# Patient Record
Sex: MALE | Race: BLACK OR AFRICAN AMERICAN | Employment: FULL TIME | URBAN - METROPOLITAN AREA
[De-identification: names, ages, dates, MRNs, and addresses within clinical notes are randomized per-mention and may not be internally consistent; named-entity substitution may affect disease eponyms.]

---

## 2017-07-14 ENCOUNTER — OFFICE VISIT (OUTPATIENT)
Dept: UROLOGY | Age: 32
End: 2017-07-14

## 2017-07-14 VITALS
WEIGHT: 315 LBS | BODY MASS INDEX: 47.74 KG/M2 | DIASTOLIC BLOOD PRESSURE: 62 MMHG | HEART RATE: 72 BPM | OXYGEN SATURATION: 98 % | HEIGHT: 68 IN | SYSTOLIC BLOOD PRESSURE: 102 MMHG

## 2017-07-14 DIAGNOSIS — N48.0 BXO (BALANITIS XEROTICA OBLITERANS): Primary | ICD-10-CM

## 2017-07-14 LAB
BILIRUB UR QL STRIP: NEGATIVE
GLUCOSE UR-MCNC: NORMAL MG/DL
KETONES P FAST UR STRIP-MCNC: NEGATIVE MG/DL
PH UR STRIP: 6.5 [PH] (ref 4.6–8)
PROT UR QL STRIP: NORMAL MG/DL
SP GR UR STRIP: 1.01 (ref 1–1.03)
UA UROBILINOGEN AMB POC: NORMAL (ref 0.2–1)
URINALYSIS CLARITY POC: CLEAR
URINALYSIS COLOR POC: YELLOW
URINE BLOOD POC: NORMAL
URINE LEUKOCYTES POC: NEGATIVE
URINE NITRITES POC: NEGATIVE

## 2017-07-14 NOTE — PATIENT INSTRUCTIONS
Adult Circumcision: Before Your Surgery  What is circumcision? Circumcision removes the skin that covers the head of the penis. This is called the foreskin. Your doctor will \"push\" the foreskin from the head of the penis. Then he or she will trim the foreskin and sew down the edges. Your doctor may use any one of a number of ways to do this. You will have some small stitches. They will dissolve on their own. Most men go home the same day as the surgery. You may have the surgery because you can't roll back your foreskin. This is called phimosis. Or you may need the surgery because your foreskin is stuck behind the penis head. This is called paraphimosis. After surgery, these problems will go away. Some men have this surgery for Yazdanism or social reasons. You can go back to work and your normal routine within 1 week, depending on your job. Follow-up care is a key part of your treatment and safety. Be sure to make and go to all appointments, and call your doctor if you are having problems. It's also a good idea to know your test results and keep a list of the medicines you take. What happens before surgery? Surgery can be stressful. This information will help you understand what you can expect. And it will help you safely prepare for surgery. Preparing for surgery  · Understand exactly what surgery is planned, along with the risks, benefits, and other options. · Tell your doctors ALL the medicines, vitamins, supplements, and herbal remedies you take. Some of these can increase the risk of bleeding or interact with anesthesia. · If you take blood thinners, such as warfarin (Coumadin), clopidogrel (Plavix), or aspirin, be sure to talk to your doctor. He or she will tell you if you should stop taking these medicines before your surgery. Make sure that you understand exactly what your doctor wants you to do. · Your doctor will tell you which medicines to take or stop before your surgery.  You may need to stop taking certain medicines a week or more before surgery. So talk to your doctor as soon as you can. · If you have an advance directive let your doctor know. It may include a living will and a durable power of  for health care. Bring a copy to the hospital. If you don't have one, you may want to prepare one. It lets your doctor and loved ones know your health care wishes. Doctors advise that everyone prepare these papers before any type of surgery or procedure. What happens on the day of surgery? · Follow the instructions exactly about when to stop eating and drinking. If you don't, your surgery may be canceled. If your doctor told you to take your medicines on the day of surgery, take them with only a sip of water. · Take a bath or shower before you come in for your surgery. Do not apply lotions, colognes, or deodorants. · Take off all jewelry and piercings. And take out contact lenses, if you wear them. At the hospital or surgery center  · Bring a picture ID. · The area for surgery is often marked to make sure there are no errors. · You will be kept comfortable and safe by your anesthesia provider. The anesthesia may make you sleep. Or it may just numb the area being worked on. · The surgery usually takes less than 1 hour. Going home  · Be sure you have someone to drive you home. Anesthesia and pain medicine make it unsafe for you to drive. · You will be given more specific instructions about recovering from your surgery. They will cover things like diet, wound care, follow-up care, driving, and getting back to your normal routine. When should you call your doctor? · You have questions or concerns. · You don't understand how to prepare for your surgery. · You become ill before the surgery (such as fever, flu, or a cold). · You need to reschedule or have changed your mind about having the surgery. Where can you learn more? Go to http://eriberto-reema.info/.   Enter A552 in the search box to learn more about \"Adult Circumcision: Before Your Surgery. \"  Current as of: March 14, 2017  Content Version: 11.3  © 0995-3362 [employer]. Care instructions adapted under license by [employer] (which disclaims liability or warranty for this information). If you have questions about a medical condition or this instruction, always ask your healthcare professional. [place] [address] any warranty or liability for your use of this information.

## 2017-07-14 NOTE — PROGRESS NOTES
MrWillie Gely Chacko has a reminder for a \"due or due soon\" health maintenance. I have asked that he contact his primary care provider for follow-up on this health maintenance.

## 2017-07-14 NOTE — MR AVS SNAPSHOT
Visit Information Date & Time Provider Department Dept. Phone Encounter #  
 7/14/2017  1:00 PM Aba Brooke Sachin [phone] [id number] Follow-up Instructions Return if symptoms worsen or fail to improve. Follow-up and Disposition History Upcoming Health Maintenance Date Due HEMOGLOBIN A1C Q6M 1985 LIPID PANEL Q1 1986 FOOT EXAM Q1 8/26/1996 MICROALBUMIN Q1 8/26/1996 EYE EXAM RETINAL OR DILATED Q1 8/26/1996 Pneumococcal 19-64 Medium Risk (1 of 1 - PPSV23) 8/26/2005 DTaP/Tdap/Td series (1 - Tdap) 8/26/2007 INFLUENZA AGE 9 TO ADULT 8/1/2017 Allergies as of 7/14/2017  Review Complete On: 7/14/2017 By: Vika Kevin MD  
  
 Severity Noted Reaction Type Reactions Penicillins  05/04/2016    Unknown (comments) Not sure of reaction, happened when younger. Current Immunizations  Never Reviewed No immunizations on file. Not reviewed this visit You Were Diagnosed With   
  
 Codes Comments BXO (balanitis xerotica obliterans)    -  Primary ICD-10-CM: N48.0 ICD-9-CM: 607.81 Vitals BP Pulse Height(growth percentile) Weight(growth percentile) SpO2 BMI  
 102/62 (BP 1 Location: Right arm, BP Patient Position: Sitting) 72 5' 8\" (1.727 m) (!) 356 lb (161.5 kg) 98% 54.13 kg/m2 Smoking Status Never Smoker Vitals History BMI and BSA Data Body Mass Index Body Surface Area  
 54.13 kg/m 2 2.78 m 2 Preferred Pharmacy Pharmacy Name Phone [id number] [address] [phone] Your Updated Medication List  
  
   
This list is accurate as of: 7/14/17  4:45 PM.  Always use your most recent med list.  
  
  
  
  
 clobetasol 0.05 % topical cream  
Commonly known as:  O[place]en [place] Apply  to affected area two (2) times a day. metFORMIN 500 mg tablet Commonly known as:  GLUCOPHAGE Take 500 mg by mouth. nystatin topical cream  
Commonly known as:  MYCOSTATIN Apply  to affected area once over twenty-four (24) hours. We Performed the Following AMB POC URINALYSIS DIP STICK AUTO W/O MICRO [01193 CPT(R)] Follow-up Instructions Return if symptoms worsen or fail to improve. Patient Instructions Adult Circumcision: Before Your Surgery What is circumcision? Circumcision removes the skin that covers the head of the penis. This is called the foreskin. Your doctor will \"push\" the foreskin from the head of the penis. Then he or she will trim the foreskin and sew down the edges. Your doctor may use any one of a number of ways to do this. You will have some small stitches. They will dissolve on their own. Most men go home the same day as the surgery. You may have the surgery because you can't roll back your foreskin. This is called phimosis. Or you may need the surgery because your foreskin is stuck behind the penis head. This is called paraphimosis. After surgery, these problems will go away. Some men have this surgery for Orthodoxy or social reasons. You can go back to work and your normal routine within 1 week, depending on your job. Follow-up care is a key part of your treatment and safety. Be sure to make and go to all appointments, and call your doctor if you are having problems. It's also a good idea to know your test results and keep a list of the medicines you take. What happens before surgery? Surgery can be stressful. This information will help you understand what you can expect. And it will help you safely prepare for surgery. Preparing for surgery · Understand exactly what surgery is planned, along with the risks, benefits, and other options. · Tell your doctors ALL the medicines, vitamins, supplements, and herbal remedies you take.  Some of these can increase the risk of bleeding or interact with anesthesia. · If you take blood thinners, such as warfarin (Coumadin), clopidogrel (Plavix), or aspirin, be sure to talk to your doctor. He or she will tell you if you should stop taking these medicines before your surgery. Make sure that you understand exactly what your doctor wants you to do. · Your doctor will tell you which medicines to take or stop before your surgery. You may need to stop taking certain medicines a week or more before surgery. So talk to your doctor as soon as you can. · If you have an advance directive let your doctor know. It may include a living will and a durable power of  for health care. Bring a copy to the hospital. If you don't have one, you may want to prepare one. It lets your doctor and loved ones know your health care wishes. Doctors advise that everyone prepare these papers before any type of surgery or procedure. What happens on the day of surgery? · Follow the instructions exactly about when to stop eating and drinking. If you don't, your surgery may be canceled. If your doctor told you to take your medicines on the day of surgery, take them with only a sip of water. · Take a bath or shower before you come in for your surgery. Do not apply lotions, colognes, or deodorants. · Take off all jewelry and piercings. And take out contact lenses, if you wear them. At the hospital or surgery center · Bring a picture ID. · The area for surgery is often marked to make sure there are no errors. · You will be kept comfortable and safe by your anesthesia provider. The anesthesia may make you sleep. Or it may just numb the area being worked on. · The surgery usually takes less than 1 hour. Going home · Be sure you have someone to drive you home. Anesthesia and pain medicine make it unsafe for you to drive. · You will be given more specific instructions about recovering from your surgery.  They will cover things like diet, wound care, follow-up care, driving, and getting back to your normal routine. When should you call your doctor? · You have questions or concerns. · You don't understand how to prepare for your surgery. · You become ill before the surgery (such as fever, flu, or a cold). · You need to reschedule or have changed your mind about having the surgery. Where can you learn more? Go to http://eriberto-reema.info/. Enter T660 in the search box to learn more about \"Adult Circumcision: Before Your Surgery. \" Current as of: March 14, 2017 Content Version: 11.3 © 8731-8452 [employer]. Care instructions adapted under license by [employer] (which disclaims liability or warranty for this information). If you have questions about a medical condition or this instruction, always ask your healthcare professional. [address] any warranty or liability for your use of this information. Patient Instructions History Introducing [facility]! [facility] introduces [employer] patient portal. Now you can access parts of your medical record, email your doctor's office, and request medication refills online. 1. In your internet browser, go to [email or web address] 2. Click on the First Time User? Click Here link in the Sign In box. You will see the New Member Sign Up page. 3. Enter your [employer] Access Code exactly as it appears below. You will not need to use this code after youve completed the sign-up process. If you do not sign up before the expiration date, you must request a new code. · [employer] Access Code: [facility] Expires: 10/12/2017  1:31 PM 
 
4. Enter the last four digits of your Social Security Number (xxxx) and Date of Birth (mm/dd/yyyy) as indicated and click Submit. You will be taken to the next sign-up page. 5. Create a [employer] ID.  This will be your [employer] login ID and cannot be changed, so think of one that is secure and easy to remember. 6. Create a Dibbz password. You can change your password at any time. 7. Enter your Password Reset Question and Answer. This can be used at a later time if you forget your password. 8. Enter your e-mail address. You will receive e-mail notification when new information is available in [address] 9. Click Sign Up. You can now view and download portions of your medical record. 10. Click the Download Summary menu link to download a portable copy of your medical information. If you have questions, please visit the Frequently Asked Questions section of the [employer] website. Remember, [employer] is NOT to be used for urgent needs. For medical emergencies, dial 911. Now available from your iPhone and Android! Please provide this summary of care documentation to your next provider. Your primary care clinician is listed as Andrew Morgan. If you have any questions after today's visit, please call [phone].

## 2017-07-14 NOTE — PROGRESS NOTES
Saarvanan Perea    Chief Complaint   Patient presents with    New Patient     balanitis xerotica obliterans    Lichens Sclerosis (BXO)       History and Physical    The patient is a pleasant 73-year-old -American male who is here for second opinion regarding his genital issues. The patient has long-standing significant obesity and was diagnosed with diabetes as a result of an episode of monilia balanoposthitis. The patient had been circumcised prior to that but his obesity has given him significant skin redundancy. In May 2016 the patient underwent a revision to minimize the risk of persistent monilia balanoposthitis. The patient continued to have difficulty in the sense that he had very little penile shaft to work with either for voiding or for sex. He also would have intermittent fungal infections manifest by odor or discharge or flaky whitish debris. The patient also was noted that there are individual nodules that might come up and go down. The patient was seen at the [facility] for reconstructive surgery and his assessment was for weight loss, diabetes control, and topical application of appropriate medications. Past Medical History:   Diagnosis Date    Acquired buried penis     BXO (balanitis xerotica obliterans)     Diabetes ([address]     Fungal infection     Lichen sclerosus     Microscopic hematuria     Morbid obesity (HCC)     Painful erection     Skin fissure      Patient Active Problem List   Diagnosis Code    Painful erection N48.30    Morbid obesity ([address] E66.01    Microscopic hematuria R31.29    Fungal infection B49    Diabetes ([address] E11.9     Past Surgical History:   Procedure Laterality Date    HX ORTHOPAEDIC      right leg surgery     Current Outpatient Prescriptions   Medication Sig Dispense Refill    metFORMIN (GLUCOPHAGE) 500 mg tablet Take 500 mg by mouth.  clobetasol (TEMOVATE) 0.05 % topical cream Apply  to affected area two (2) times a day. 15 g 5    nystatin (MYCOSTATIN) topical cream Apply  to affected area once over twenty-four (24) hours. 2 Tube 5     Allergies   Allergen Reactions    Penicillins Unknown (comments)     Not sure of reaction, happened when younger. Social History     Social History    Marital status: SINGLE     Spouse name: N/A    Number of children: N/A    Years of education: N/A     Occupational History    Not on file. Social History Main Topics    Smoking status: Never Smoker    Smokeless tobacco: Never Used    Alcohol use No    Drug use: No    Sexual activity: Yes     Partners: Female     Other Topics Concern    Not on file     Social History Narrative      History reviewed. No pertinent family history. Visit Vitals    /62 (BP 1 Location: Right arm, BP Patient Position: Sitting)    Pulse 72    Ht 5' 8\" (1.727 m)    Wt (!) 356 lb (161.5 kg)    SpO2 98%    BMI 54.13 kg/m2     Physical        Gen: WDWN adult NAD obese with a BMI of 54 kg/m²  Head  : normocephalic,  Normal ROM; eyes without normal pupils, EOMs, no masses;  conjunctiva normal  Neck: normal movement,  no evident mass,  No evident adenopathy, trachea midline,  Lungs clear to auscultation with no rales or ronchi or rubs  Cardiac NSR with no murmur, rub, extra sounds  Abdobese  Flanks     -the scrotum is normally developed as has normal scrotal contents. There is some chronic inflammatory change across the upper midline. The penis actually has good exposure had a predictable amount of chronic inflammatory change surrounding it. There is a significant prepubic fat pad that essentially varies the penis.   I estimate that the flaccid stretched length from pubis to glans penis is actually within normal limits for the patient    Extremities- no edema, arthritis, deformity, swelling  Psych- oriented, no evident anxiety, no cognitive impairment evident    The urine is 1+ positive for blood on dipstick but I am only seeing a rare red blood cell on microscopic                  Impression/ PLAN  Chronic balanoposthitis    Plan:  I had an extended discussion with the patient and we have talked primarily about weight loss. The patient's issues are all pretty much due to his obesity and the subsequent diabetes. I believe that weight loss to his ideal body weight would result in complete resolution of his diabetes and his issues regarding his genital area would be completely resolved. The patient will use the topical agents that were previously mentioned to him and I have talked about a variety of exercise and dietary manipulations that he can try. This visit exceeded 45 minutes and >50% was counselling  The patient understands the discussion and plan    PLEASE NOTE:      This document has been produced using voice recognition software.   Unrecognized errors in transcription may be present    Erin Butler MD

## 2017-08-02 PROBLEM — N48.0 BXO (BALANITIS XEROTICA OBLITERANS): Status: ACTIVE | Noted: 2017-08-02

## 2017-08-24 ENCOUNTER — HOSPITAL ENCOUNTER (OUTPATIENT)
Dept: BARIATRICS/WEIGHT MGMT | Age: 32
Discharge: HOME | End: 2017-08-24

## 2017-08-24 ENCOUNTER — DOCUMENTATION ONLY (OUTPATIENT)
Dept: BARIATRICS/WEIGHT MGMT | Age: 32
End: 2017-08-24

## 2017-08-24 NOTE — PROGRESS NOTES
[address] Loss Tavares Haramn [address] [facility], [address]    Patient's Name: Ramesh Joya   Age: 28 y.o. YOB: 1985   Sex: male    Date:   8/24/2017    Insurance:            Session: 1 of 12  Revision:   Surgeon:  Dr. Xavier Rivas    Height: 5 f 9 Weight:    353      Lbs. BMI: 52.3   Pounds Lost since last month: 0               Pounds Gained since last month: 0    Starting Weight: 353   Previous Months Weight: 353  Overall Pounds Lost: 0 Overall Pounds Gained: 0      Do you smoke? None    Alcohol intake:  Number of drinks at a time:  None  Number of times a week: None    Class Guidelines    Guidelines are reviewed with patient at the start of every class. 1. Patient understands that weight loss trial classes must be consecutive. Patient understands if they miss a class, it is their responsibility to contact me to reschedule class. I will reach out to patient after their first no show. 2.  Patient understands the expectations that weight maintenance/weight loss is expected during the classes. Failure to demonstrate changes may result in one extra month of weight loss trial, followed by going back to see the surgeon. 3. Patient is also instructed to be doing their labs, blood work, psych visit, support group and any other test that the surgeon has used while they are working on their weight loss trial.    Other Pertinent Information:     Changes Made Since Last Class:     Eating Habits and Behaviors      Today we reviewed white diet principles. The main objective was focusing on carbohydrates. Patient was also given ideas of foods that are high in carbohydrates and foods that are lower in carbohydrates. Patient was instructed to stick to meat and vegetables only and try to keep their daily carbohydrate intake less than 100 grams per day. During class, I also gave a power point on Water and Caffeine.   In this presentation, we talked about the importance and how not getting enough fluid can impact their weight loss. Patient understands that 64 ounces is the minimum amount of fluid. We talked about the best sources of fluid choices. Patient was also given a list of common drinks and the amount of calories in these drinks, such as 250 calories in a large sweet teas, 300-500 calories in a smoothie, and 400 calories in some coffee drinks. We talked about signs and symptoms of dehydration. Patient was also given tips on how to wean from caffeine. Patient's current diet habits include: 4 meals a day. States he is snacking late at night and in bed. He is eating 4 servings of bread per day, 2 servings of chips, 1 of cake, 2 servings of cookies per day. I have talked to him about carbohydrates and lower carbohydrate snacks. He is eating out 4-6 x a week, which I have talked with him about the importance of planning ahead of time. He did not indicate on his diet history how much fluid he is drinking per day        Physical Activity/Exercise    Comments:     Currently for exercise, patient is doing some walking. We talked about activities for patient to do, including walking, swimming, or chair exercises. Behavior Modification       Comments: In class, we also focused on some behavior modifications. Patient was instructed to aim for 3 meals a day and eating within 1 hour of waking up. Patient is encouraged not to go longer than 3-4 hours between meals, but if physical hunger occurs to go for a protein-based snack that we discussed. We also spent some time about the importance of planning ahead. Patient was encouraged to start thinking about what they were going to eat throughout the week for meals, so they can grocery shop, and be well-prepared. Lack of planning is often what creates havoc on a healthy lifestyle change.            Lavon Rivas, MS RD  8/24/2017

## 2017-09-28 ENCOUNTER — DOCUMENTATION ONLY (OUTPATIENT)
Dept: BARIATRICS/WEIGHT MGMT | Age: 32
End: 2017-09-28

## 2017-09-28 NOTE — PROGRESS NOTES
9/28/17:  Patient did not show for his 2nd nutrition class. He was left a voicemail to reschedule.     Serge Quintero MS RD

## 2019-02-15 PROBLEM — B37.2 CANDIDIASIS OF SKIN: Status: ACTIVE | Noted: 2018-06-29

## 2019-02-15 PROBLEM — I10 ESSENTIAL HYPERTENSION: Status: ACTIVE | Noted: 2018-06-29

## 2021-11-23 ENCOUNTER — IMPORTED ENCOUNTER (OUTPATIENT)
Dept: URBAN - NONMETROPOLITAN AREA CLINIC 1 | Facility: CLINIC | Age: 36
End: 2021-11-23

## 2021-11-23 PROBLEM — H25.813: Noted: 2021-11-23

## 2021-11-23 PROBLEM — H16.223: Noted: 2021-11-23

## 2021-11-23 PROBLEM — E11.3293: Noted: 2021-11-23

## 2021-11-23 PROCEDURE — S0620 ROUTINE OPHTHALMOLOGICAL EXA: HCPCS

## 2021-11-23 NOTE — PATIENT DISCUSSION
DM c Mild BDR-Discussed fluctuation in vision as BS changes-A1C 11 BS runs in the 300's -Recommend stabilizing BS for at least 90 days before dispensing MRx-Stressed the importance of keeping blood sugars under control blood pressure under control and weight normalization and regular visits with PCP. -Explained the possible effects of poorly controlled diabetes and the damage that diabetes can cause to ocular health. -Patient to check HgbA1C.-Pt instructed to contact our office with any vision changes.-Notes to [address] [place] [address] surgical. -Reviewed symptoms of advancing cataract growth such as glare and halos and decreased vision.-Continue to monitor for now. Pt will notify us if any new symptoms develop.

## 2022-04-09 ASSESSMENT — TONOMETRY
OD_IOP_MMHG: 18
OS_IOP_MMHG: 17

## 2022-04-09 ASSESSMENT — VISUAL ACUITY
OS_CC: 20/20
OD_CC: 20/20

## 2022-05-04 ENCOUNTER — COMPREHENSIVE EXAM (OUTPATIENT)
Dept: RURAL CLINIC 1 | Facility: CLINIC | Age: 37
End: 2022-05-04

## 2022-05-04 DIAGNOSIS — H25.813: ICD-10-CM

## 2022-05-04 DIAGNOSIS — H16.223: ICD-10-CM

## 2022-05-04 DIAGNOSIS — E11.9: ICD-10-CM

## 2022-05-04 PROCEDURE — 92014 COMPRE OPH EXAM EST PT 1/>: CPT

## 2022-05-04 ASSESSMENT — VISUAL ACUITY
OS_CC: 20/20
OD_CC: 20/20

## 2022-05-04 ASSESSMENT — TONOMETRY
OD_IOP_MMHG: 13
OS_IOP_MMHG: 13

## 2022-11-28 ENCOUNTER — EMERGENCY VISIT (OUTPATIENT)
Dept: RURAL CLINIC 1 | Facility: CLINIC | Age: 37
End: 2022-11-28

## 2022-11-28 DIAGNOSIS — H16.223: ICD-10-CM

## 2022-11-28 DIAGNOSIS — E11.3293: ICD-10-CM

## 2022-11-28 DIAGNOSIS — H52.13: ICD-10-CM

## 2022-11-28 DIAGNOSIS — H25.813: ICD-10-CM

## 2022-11-28 PROCEDURE — 92015 DETERMINE REFRACTIVE STATE: CPT

## 2022-11-28 PROCEDURE — 99214 OFFICE O/P EST MOD 30 MIN: CPT

## 2022-11-28 PROCEDURE — 92134 CPTRZ OPH DX IMG PST SGM RTA: CPT

## 2022-11-28 ASSESSMENT — VISUAL ACUITY
OS_SC: 20/20
OD_SC: 20/20

## 2022-11-28 ASSESSMENT — TONOMETRY
OD_IOP_MMHG: 15
OS_IOP_MMHG: 15

## 2023-05-16 ENCOUNTER — EMERGENCY VISIT (OUTPATIENT)
Dept: RURAL CLINIC 1 | Facility: CLINIC | Age: 38
End: 2023-05-16

## 2023-05-16 DIAGNOSIS — H10.423: ICD-10-CM

## 2023-05-16 DIAGNOSIS — E11.3293: ICD-10-CM

## 2023-05-16 PROCEDURE — 99213 OFFICE O/P EST LOW 20 MIN: CPT

## 2023-05-16 ASSESSMENT — VISUAL ACUITY
OS_SC: 20/20
OS_SC: 20/20
OU_SC: 20/20
OU_SC: 20/20
OD_SC: 20/20
OD_SC: 20/20-1

## 2024-06-24 ENCOUNTER — ESTABLISHED PATIENT (OUTPATIENT)
Dept: RURAL CLINIC 1 | Facility: CLINIC | Age: 39
End: 2024-06-24

## 2024-06-24 DIAGNOSIS — E11.3293: ICD-10-CM

## 2024-06-24 DIAGNOSIS — H25.813: ICD-10-CM

## 2024-06-24 DIAGNOSIS — H16.223: ICD-10-CM

## 2024-06-24 PROCEDURE — 92014 COMPRE OPH EXAM EST PT 1/>: CPT

## 2024-06-24 ASSESSMENT — VISUAL ACUITY
OU_SC: 20/20
OS_CC: 20/20-1
OU_CC: 20/20
OS_SC: 20/20
OD_CC: 20/20
OD_SC: 20/20

## 2024-06-24 ASSESSMENT — TONOMETRY
OS_IOP_MMHG: 15
OD_IOP_MMHG: 16

## 2024-07-02 ENCOUNTER — EMERGENCY VISIT (OUTPATIENT)
Dept: RURAL CLINIC 1 | Facility: CLINIC | Age: 39
End: 2024-07-02

## 2024-07-02 DIAGNOSIS — H20.012: ICD-10-CM

## 2024-07-02 PROCEDURE — 99213 OFFICE O/P EST LOW 20 MIN: CPT

## 2024-07-02 ASSESSMENT — VISUAL ACUITY
OS_SC: 20/20
OD_SC: 20/20

## 2024-07-09 ENCOUNTER — FOLLOW UP (OUTPATIENT)
Dept: RURAL CLINIC 1 | Facility: CLINIC | Age: 39
End: 2024-07-09

## 2024-07-09 DIAGNOSIS — H20.012: ICD-10-CM

## 2024-07-09 PROCEDURE — 99213 OFFICE O/P EST LOW 20 MIN: CPT

## 2024-07-09 ASSESSMENT — VISUAL ACUITY
OS_SC: 20/20-1
OD_SC: 20/20-1

## 2024-07-09 ASSESSMENT — TONOMETRY
OD_IOP_MMHG: 17
OS_IOP_MMHG: 17

## 2025-04-14 ENCOUNTER — COMPREHENSIVE EXAM (OUTPATIENT)
Age: 40
End: 2025-04-14

## 2025-04-14 DIAGNOSIS — H16.223: ICD-10-CM

## 2025-04-14 DIAGNOSIS — H25.813: ICD-10-CM

## 2025-04-14 DIAGNOSIS — E11.3293: ICD-10-CM

## 2025-04-14 PROCEDURE — 92014 COMPRE OPH EXAM EST PT 1/>: CPT
